# Patient Record
Sex: FEMALE | Race: WHITE | NOT HISPANIC OR LATINO
[De-identification: names, ages, dates, MRNs, and addresses within clinical notes are randomized per-mention and may not be internally consistent; named-entity substitution may affect disease eponyms.]

---

## 2024-04-22 ENCOUNTER — NON-APPOINTMENT (OUTPATIENT)
Age: 29
End: 2024-04-22

## 2024-04-22 PROBLEM — Z00.00 ENCOUNTER FOR PREVENTIVE HEALTH EXAMINATION: Status: ACTIVE | Noted: 2024-04-22

## 2024-04-29 ENCOUNTER — APPOINTMENT (OUTPATIENT)
Dept: SURGERY | Facility: CLINIC | Age: 29
End: 2024-04-29
Payer: COMMERCIAL

## 2024-04-29 VITALS — HEART RATE: 72 BPM | SYSTOLIC BLOOD PRESSURE: 123 MMHG | DIASTOLIC BLOOD PRESSURE: 83 MMHG | TEMPERATURE: 98.2 F

## 2024-04-29 DIAGNOSIS — Z87.19 PERSONAL HISTORY OF OTHER DISEASES OF THE DIGESTIVE SYSTEM: ICD-10-CM

## 2024-04-29 PROCEDURE — 99204 OFFICE O/P NEW MOD 45 MIN: CPT

## 2024-04-30 NOTE — HISTORY OF PRESENT ILLNESS
[de-identified] : 27 yo F referred by Dr. Garcia for recurrent epigastric pain. She had similar episode many years ago but that resolved. It has started recurring again since this fall and happens specifically after eating higher fat foods. Pain is in the epigastric area and occurs 30 minutes after a meal and lasts several hours. She had CT, US, upper and lower endoscopy and liver elastography. No source for her symptoms has been identified. Dr. Garcia suspects microlithiasis or other gallbladder etiology.

## 2024-04-30 NOTE — ASSESSMENT
[FreeTextEntry1] : 27 yo F with post-prandial recurrent epigastric and RUQ pain c/w biliary colic either from sludge not evident on US or biliary dyskinesia.  We had a long discussion about the anatomy and pathophysiology of the biliary system. We discussed the manifestation of gallstones including biliary colic, acute cholecystitis, choledocholithiasis, cholangitis and pancreatitis. We discussed further workup with possible HIDA scan but given her symptoms and all other workup being negative, she would like to proceed with surgery. We will plan for robotic cholecystectomy. We discussed the risks and benefits including infection, bleeding, injury to vital structures. We also discussed the expected recovery as well as possible side effects of having the gallbladder removed. The patient is in agreement to proceed and understands that her symptoms may not be alleviated by surgery. She is still interested in pursuing resection. Surgery would be performed at [facility] under general anesthesia as an ambulatory procedure.  Plan for May 3, 2024.

## 2024-04-30 NOTE — PHYSICAL EXAM
[Respiratory Effort] : normal respiratory effort [Normal Rate and Rhythm] : normal rate and rhythm [No Rash or Lesion] : No rash or lesion [Calm] : calm [de-identified] : NAD, well-appearing [de-identified] : soft, nondistended, nontender

## 2024-05-03 ENCOUNTER — TRANSCRIPTION ENCOUNTER (OUTPATIENT)
Age: 29
End: 2024-05-03

## 2024-05-03 ENCOUNTER — APPOINTMENT (OUTPATIENT)
Dept: SURGERY | Facility: HOSPITAL | Age: 29
End: 2024-05-03

## 2024-05-03 ENCOUNTER — RESULT REVIEW (OUTPATIENT)
Age: 29
End: 2024-05-03

## 2024-05-09 ENCOUNTER — NON-APPOINTMENT (OUTPATIENT)
Age: 29
End: 2024-05-09

## 2024-05-14 ENCOUNTER — NON-APPOINTMENT (OUTPATIENT)
Age: 29
End: 2024-05-14

## 2024-05-20 ENCOUNTER — APPOINTMENT (OUTPATIENT)
Dept: SURGERY | Facility: CLINIC | Age: 29
End: 2024-05-20
Payer: COMMERCIAL

## 2024-05-20 VITALS — HEART RATE: 81 BPM | DIASTOLIC BLOOD PRESSURE: 83 MMHG | TEMPERATURE: 98 F | SYSTOLIC BLOOD PRESSURE: 121 MMHG

## 2024-05-20 DIAGNOSIS — K80.50 CALCULUS OF BILE DUCT W/OUT CHOLANGITIS OR CHOLECYSTITIS W/OUT OBSTRUCTION: ICD-10-CM

## 2024-05-20 PROCEDURE — 99024 POSTOP FOLLOW-UP VISIT: CPT

## 2024-05-20 NOTE — ASSESSMENT
[FreeTextEntry1] : 28 yo F s/p robotic cholecystectomy. Doing much better without symptoms.  Gradual return to normal activity and diet Follow up as needed

## 2024-05-20 NOTE — PHYSICAL EXAM
[Respiratory Effort] : normal respiratory effort [Normal Rate and Rhythm] : normal rate and rhythm [No Rash or Lesion] : No rash or lesion [Calm] : calm [de-identified] : NAD, well-appearing [de-identified] : anicteric [de-identified] : soft, nondistended, nontender with well-healing incisions c/d/i

## 2024-05-20 NOTE — HISTORY OF PRESENT ILLNESS
[de-identified] : Patient returns for follow up after cholecystectomy. She is feeling much better and is now able to tolerate the foods that used to trigger her attacks without pain. She has recovered well from surgery and has no complaints.

## 2025-02-20 ENCOUNTER — TRANSCRIPTION ENCOUNTER (OUTPATIENT)
Age: 30
End: 2025-02-20